# Patient Record
Sex: FEMALE | URBAN - METROPOLITAN AREA
[De-identification: names, ages, dates, MRNs, and addresses within clinical notes are randomized per-mention and may not be internally consistent; named-entity substitution may affect disease eponyms.]

---

## 2020-03-06 ENCOUNTER — APPOINTMENT (RX ONLY)
Dept: URBAN - METROPOLITAN AREA CLINIC 108 | Facility: CLINIC | Age: 25
Setting detail: DERMATOLOGY
End: 2020-03-06

## 2020-03-06 VITALS — SYSTOLIC BLOOD PRESSURE: 119 MMHG | DIASTOLIC BLOOD PRESSURE: 79 MMHG

## 2020-03-06 DIAGNOSIS — L63.8 OTHER ALOPECIA AREATA: ICD-10-CM | Status: WORSENING

## 2020-03-06 PROCEDURE — ? INTRAMUSCULAR KENALOG

## 2020-03-06 PROCEDURE — ? COUNSELING

## 2020-03-06 PROCEDURE — ? SEPARATE AND IDENTIFIABLE DOCUMENTATION

## 2020-03-06 PROCEDURE — 99212 OFFICE O/P EST SF 10 MIN: CPT | Mod: 25

## 2020-03-06 PROCEDURE — ? PRESCRIPTION

## 2020-03-06 RX ORDER — TRETIONIN 0.25 MG/G
GEL TOPICAL
Qty: 1 | Refills: 1 | COMMUNITY
Start: 2020-03-06

## 2020-03-06 RX ADMIN — TRETIONIN: 0.25 GEL TOPICAL at 00:00

## 2020-03-06 ASSESSMENT — LOCATION ZONE DERM: LOCATION ZONE: TRUNK

## 2020-03-06 ASSESSMENT — LOCATION DETAILED DESCRIPTION DERM: LOCATION DETAILED: RIGHT BUTTOCK

## 2020-03-06 ASSESSMENT — LOCATION SIMPLE DESCRIPTION DERM: LOCATION SIMPLE: RIGHT BUTTOCK

## 2020-03-06 ASSESSMENT — SEVERITY ASSESSMENT OVERALL AMONG ALL PATIENTS
IN YOUR EXPERIENCE, AMONG ALL PATIENTS YOU HAVE SEEN WITH THIS CONDITION, HOW SEVERE IS THIS PATIENT'S CONDITION?: S1 (LESS THAN 25% HAIR LOSS)

## 2020-03-06 NOTE — PROCEDURE: INTRAMUSCULAR KENALOG
Detail Level: None
Expiration Date (Optional): sep2020
Concentration (Mg/Ml): 40.0
Lot # (Optional): [id number]
Add Option For Additional Mediation: No
Concentration (Mg/Ml) Of Additional Medication: 2.5
Total Volume (Ccs): 1
Kenalog Preparation: kenalog
Consent: The risks of atrophy were reviewed with the patient.

## 2020-03-06 NOTE — HPI: HAIR LOSS
How Did The Hair Loss Occur?: gradual in onset
How Severe Is Your Hair Loss?: moderate
Additional History: Patient has been getting treated for AA for years. Patient was last seen 3/3/17. At the time we’re treating ot with IMTAC 40mg/ml (1cc). Patient has a history of using Topicort spray which was no help. It was also irritating to the scalp so she stopped topical. Patient also been prescribed clobetasol solution but never filled it. Patient was diagnosed with AA at an early age. Pt has recently had blood work done. Pt maybe be getting X-rays to see the function of her thyroid.

## 2020-05-15 ENCOUNTER — APPOINTMENT (RX ONLY)
Dept: URBAN - METROPOLITAN AREA CLINIC 108 | Facility: CLINIC | Age: 25
Setting detail: DERMATOLOGY
End: 2020-05-15

## 2020-05-15 VITALS — DIASTOLIC BLOOD PRESSURE: 73 MMHG | SYSTOLIC BLOOD PRESSURE: 119 MMHG

## 2020-05-15 DIAGNOSIS — L63.8 OTHER ALOPECIA AREATA: ICD-10-CM

## 2020-05-15 DIAGNOSIS — L20.89 OTHER ATOPIC DERMATITIS: ICD-10-CM

## 2020-05-15 PROBLEM — L20.84 INTRINSIC (ALLERGIC) ECZEMA: Status: ACTIVE | Noted: 2020-05-15

## 2020-05-15 PROCEDURE — ? TREATMENT REGIMEN

## 2020-05-15 PROCEDURE — ? INTRAMUSCULAR KENALOG

## 2020-05-15 PROCEDURE — ? PRESCRIPTION

## 2020-05-15 PROCEDURE — 99212 OFFICE O/P EST SF 10 MIN: CPT | Mod: 25

## 2020-05-15 PROCEDURE — ? COUNSELING

## 2020-05-15 RX ORDER — TRIAMCINOLONE ACETONIDE 1 MG/G
CREAM TOPICAL
Qty: 1 | Refills: 0 | Status: ERX | COMMUNITY
Start: 2020-05-15

## 2020-05-15 RX ORDER — CLOBETASOL PROPIONATE 0.5 MG/ML
SOLUTION TOPICAL
Qty: 1 | Refills: 1 | Status: ERX | COMMUNITY
Start: 2020-05-15

## 2020-05-15 RX ADMIN — TRIAMCINOLONE ACETONIDE: 1 CREAM TOPICAL at 00:00

## 2020-05-15 RX ADMIN — CLOBETASOL PROPIONATE: 0.5 SOLUTION TOPICAL at 00:00

## 2020-05-15 ASSESSMENT — LOCATION SIMPLE DESCRIPTION DERM
LOCATION SIMPLE: LEFT BUTTOCK
LOCATION SIMPLE: LEFT POPLITEAL SKIN

## 2020-05-15 ASSESSMENT — LOCATION ZONE DERM
LOCATION ZONE: TRUNK
LOCATION ZONE: LEG

## 2020-05-15 ASSESSMENT — LOCATION DETAILED DESCRIPTION DERM
LOCATION DETAILED: LEFT POPLITEAL SKIN
LOCATION DETAILED: LEFT BUTTOCK

## 2020-05-15 NOTE — PROCEDURE: INTRAMUSCULAR KENALOG
Detail Level: None
Consent: The risks of atrophy were reviewed with the patient.
Total Volume (Ccs): 1
Concentration (Mg/Ml) Of Additional Medication: 2.5
Kenalog Preparation: kenalog
Lot # (Optional): [id number]
Expiration Date (Optional): 04/2021
Add Option For Additional Mediation: No
Concentration (Mg/Ml): 40.0

## 2020-05-15 NOTE — PROCEDURE: TREATMENT REGIMEN
Initiate Treatment: Tretinoin 0.025% every other night.\\nClobetasol 0.05% Solution to scalp BID X 2 weeks on, 2 weeks off.
Detail Level: Zone

## 2020-06-17 ENCOUNTER — APPOINTMENT (RX ONLY)
Dept: URBAN - METROPOLITAN AREA CLINIC 108 | Facility: CLINIC | Age: 25
Setting detail: DERMATOLOGY
End: 2020-06-17

## 2020-06-17 VITALS — DIASTOLIC BLOOD PRESSURE: 71 MMHG | HEART RATE: 62 BPM | SYSTOLIC BLOOD PRESSURE: 116 MMHG

## 2020-06-17 DIAGNOSIS — L63.8 OTHER ALOPECIA AREATA: ICD-10-CM | Status: IMPROVED

## 2020-06-17 DIAGNOSIS — L70.0 ACNE VULGARIS: ICD-10-CM

## 2020-06-17 PROCEDURE — ? DIAGNOSIS COMMENT

## 2020-06-17 PROCEDURE — 99212 OFFICE O/P EST SF 10 MIN: CPT | Mod: 25

## 2020-06-17 PROCEDURE — ? TREATMENT REGIMEN

## 2020-06-17 PROCEDURE — ? INTRAMUSCULAR KENALOG

## 2020-06-17 PROCEDURE — ? PHOTO-DOCUMENTATION

## 2020-06-17 ASSESSMENT — LOCATION ZONE DERM
LOCATION ZONE: SCALP
LOCATION ZONE: TRUNK

## 2020-06-17 ASSESSMENT — LOCATION SIMPLE DESCRIPTION DERM
LOCATION SIMPLE: RIGHT BUTTOCK
LOCATION SIMPLE: ANTERIOR SCALP

## 2020-06-17 ASSESSMENT — LOCATION DETAILED DESCRIPTION DERM
LOCATION DETAILED: RIGHT BUTTOCK
LOCATION DETAILED: MID-FRONTAL SCALP

## 2020-06-17 NOTE — PROCEDURE: INTRAMUSCULAR KENALOG
Total Volume (Ccs): 1
Concentration (Mg/Ml) Of Additional Medication: 2.5
Consent: The risks of atrophy were reviewed with the patient. Verbal consent was obtained.
Lot # (Optional): [id number]
Add Option For Additional Mediation: No
Treatment Number (Optional): 3
Detail Level: Simple
Kenalog Preparation: kenalog
Expiration Date (Optional): JAN 2021
Ndc# (Optional): 2236-1084-63
Concentration (Mg/Ml): 40.0
Administered By (Optional): Winnie BURNETT

## 2020-07-24 ENCOUNTER — APPOINTMENT (RX ONLY)
Dept: URBAN - METROPOLITAN AREA CLINIC 108 | Facility: CLINIC | Age: 25
Setting detail: DERMATOLOGY
End: 2020-07-24

## 2020-07-24 VITALS — HEART RATE: 63 BPM | DIASTOLIC BLOOD PRESSURE: 77 MMHG | SYSTOLIC BLOOD PRESSURE: 111 MMHG

## 2020-07-24 DIAGNOSIS — L63.8 OTHER ALOPECIA AREATA: ICD-10-CM

## 2020-07-24 PROCEDURE — ? TREATMENT REGIMEN

## 2020-07-24 PROCEDURE — ? INTRALESIONAL KENALOG

## 2020-07-24 PROCEDURE — 11900 INJECT SKIN LESIONS </W 7: CPT

## 2020-07-24 PROCEDURE — ? PRESCRIPTION

## 2020-07-24 RX ORDER — TRETIONIN 0.25 MG/G
GEL TOPICAL
Qty: 1 | Refills: 4 | Status: ERX

## 2020-07-24 ASSESSMENT — LOCATION DETAILED DESCRIPTION DERM
LOCATION DETAILED: RIGHT INFERIOR OCCIPITAL SCALP
LOCATION DETAILED: LEFT OCCIPITAL SCALP
LOCATION DETAILED: MID-OCCIPITAL SCALP

## 2020-07-24 ASSESSMENT — LOCATION SIMPLE DESCRIPTION DERM: LOCATION SIMPLE: POSTERIOR SCALP

## 2020-07-24 ASSESSMENT — LOCATION ZONE DERM: LOCATION ZONE: SCALP

## 2020-07-24 NOTE — PROCEDURE: INTRALESIONAL KENALOG
Administered By (Optional): Dr. Montalvo
Treatment Number (Optional): 1
Expiration Date (Optional): JUN 2021
X Size Of Lesion In Cm (Optional): 0
Include Z78.9 (Other Specified Conditions Influencing Health Status) As An Associated Diagnosis?: No
Medical Necessity Clause: This procedure was medically necessary because the lesions that were treated were:
Consent: The risks of atrophy were reviewed with the patient. Verbal consent was obtained.
Kenalog Preparation: Kenalog
Detail Level: Zone
Concentration Of Solution Injected (Mg/Ml): 10.0
Total Volume Injected (Ccs- Only Use Numbers And Decimals): 0.3
Lot # (Optional): [id number]
Ndc# For Kenalog Only: 0627-7326-99

## 2020-07-24 NOTE — PROCEDURE: TREATMENT REGIMEN
Continue Regimen: - Tretinoin 0.025% Gel QHS to scalp.
Modify Regimen: - Clobetasol 0.05% solution BID weekends only. May increase to previous regimen if having recurrent flares.
Detail Level: Simple

## 2020-09-04 ENCOUNTER — APPOINTMENT (RX ONLY)
Dept: URBAN - METROPOLITAN AREA CLINIC 108 | Facility: CLINIC | Age: 25
Setting detail: DERMATOLOGY
End: 2020-09-04

## 2020-09-04 VITALS — SYSTOLIC BLOOD PRESSURE: 116 MMHG | DIASTOLIC BLOOD PRESSURE: 71 MMHG | HEART RATE: 63 BPM

## 2020-09-04 DIAGNOSIS — L63.8 OTHER ALOPECIA AREATA: ICD-10-CM

## 2020-09-04 PROCEDURE — ? INTRALESIONAL KENALOG

## 2020-09-04 PROCEDURE — 11900 INJECT SKIN LESIONS </W 7: CPT

## 2020-09-04 PROCEDURE — ? TREATMENT REGIMEN

## 2020-09-04 ASSESSMENT — LOCATION ZONE DERM: LOCATION ZONE: SCALP

## 2020-09-04 ASSESSMENT — LOCATION SIMPLE DESCRIPTION DERM
LOCATION SIMPLE: SCALP
LOCATION SIMPLE: POSTERIOR SCALP

## 2020-09-04 ASSESSMENT — LOCATION DETAILED DESCRIPTION DERM
LOCATION DETAILED: LEFT CENTRAL PARIETAL SCALP
LOCATION DETAILED: LEFT SUPERIOR PARIETAL SCALP
LOCATION DETAILED: RIGHT SUPERIOR PARIETAL SCALP
LOCATION DETAILED: MID-OCCIPITAL SCALP

## 2020-09-04 NOTE — PROCEDURE: INTRALESIONAL KENALOG
Administered By (Optional): Dr. Montalvo
Treatment Number (Optional): 2
Expiration Date (Optional): AUG 2021
X Size Of Lesion In Cm (Optional): 0
Include Z78.9 (Other Specified Conditions Influencing Health Status) As An Associated Diagnosis?: No
Medical Necessity Clause: This procedure was medically necessary because the lesions that were treated were:
Consent: The risks of atrophy were reviewed with the patient. Verbal consent was obtained.
Kenalog Preparation: Kenalog
Detail Level: Zone
Concentration Of Solution Injected (Mg/Ml): 10.0
Total Volume Injected (Ccs- Only Use Numbers And Decimals): 0.2
Lot # (Optional): [id number]
Ndc# For Kenalog Only: 2848-1785-39

## 2020-09-04 NOTE — PROCEDURE: TREATMENT REGIMEN
Continue Regimen: - Tretinoin 0.025% Gel QHS to scalp.\\n\\n- Clobetasol 0.05% solution BID weekends only. May increase to previous regimen if having recurrent flares.
Detail Level: Simple

## 2020-10-27 ENCOUNTER — APPOINTMENT (RX ONLY)
Dept: URBAN - METROPOLITAN AREA CLINIC 108 | Facility: CLINIC | Age: 25
Setting detail: DERMATOLOGY
End: 2020-10-27

## 2020-10-27 ENCOUNTER — RX ONLY (OUTPATIENT)
Age: 25
Setting detail: RX ONLY
End: 2020-10-27

## 2020-10-27 VITALS — SYSTOLIC BLOOD PRESSURE: 109 MMHG | DIASTOLIC BLOOD PRESSURE: 67 MMHG | HEART RATE: 66 BPM

## 2020-10-27 DIAGNOSIS — L63.8 OTHER ALOPECIA AREATA: ICD-10-CM

## 2020-10-27 PROCEDURE — ? TREATMENT REGIMEN

## 2020-10-27 PROCEDURE — ? INTRALESIONAL KENALOG

## 2020-10-27 PROCEDURE — 11900 INJECT SKIN LESIONS </W 7: CPT

## 2020-10-27 RX ORDER — CLOBETASOL PROPIONATE 0.5 MG/ML
SOLUTION TOPICAL
Qty: 1 | Refills: 1 | Status: ERX

## 2020-10-27 ASSESSMENT — LOCATION ZONE DERM: LOCATION ZONE: SCALP

## 2020-10-27 ASSESSMENT — LOCATION SIMPLE DESCRIPTION DERM
LOCATION SIMPLE: ANTERIOR SCALP
LOCATION SIMPLE: LEFT SCALP
LOCATION SIMPLE: SCALP
LOCATION SIMPLE: RIGHT SCALP

## 2020-10-27 ASSESSMENT — LOCATION DETAILED DESCRIPTION DERM
LOCATION DETAILED: RIGHT MEDIAL FRONTAL SCALP
LOCATION DETAILED: RIGHT SUPERIOR POSTAURICULAR SKIN
LOCATION DETAILED: MID-FRONTAL SCALP
LOCATION DETAILED: LEFT SUPERIOR POSTAURICULAR SKIN
LOCATION DETAILED: RIGHT CENTRAL POSTAURICULAR SKIN
LOCATION DETAILED: LEFT MEDIAL FRONTAL SCALP
LOCATION DETAILED: LEFT CENTRAL POSTAURICULAR SKIN

## 2020-10-27 NOTE — PROCEDURE: TREATMENT REGIMEN
Initiate Treatment: - Restart Clobetasol 0.05% Solution BID x 2 weeks. \\n- Restart Tretinoin0.025% Gel QHS.
Detail Level: Zone

## 2020-10-27 NOTE — PROCEDURE: INTRALESIONAL KENALOG
Lot # (Optional): [id number]
Consent: The risks of atrophy were reviewed with the patient. Verbal consent was obtained.
Expiration Date (Optional): 03/2022
Include Z78.9 (Other Specified Conditions Influencing Health Status) As An Associated Diagnosis?: No
Detail Level: Zone
Total Volume Injected (Ccs- Only Use Numbers And Decimals): .35
Medical Necessity Clause: This procedure was medically necessary because the lesions that were treated were:
Administered By (Optional): Dr. Montalvo
Kenalog Preparation: Kenalog
Concentration Of Solution Injected (Mg/Ml): 10.0
X Size Of Lesion In Cm (Optional): 0
Ndc# For Kenalog Only: [id number]

## 2020-12-17 ENCOUNTER — APPOINTMENT (RX ONLY)
Dept: URBAN - METROPOLITAN AREA CLINIC 108 | Facility: CLINIC | Age: 25
Setting detail: DERMATOLOGY
End: 2020-12-17

## 2020-12-17 VITALS — SYSTOLIC BLOOD PRESSURE: 116 MMHG | HEART RATE: 66 BPM | DIASTOLIC BLOOD PRESSURE: 71 MMHG

## 2020-12-17 DIAGNOSIS — L63.8 OTHER ALOPECIA AREATA: ICD-10-CM | Status: WORSENING

## 2020-12-17 PROCEDURE — ? TREATMENT REGIMEN

## 2020-12-17 PROCEDURE — ? INTRAMUSCULAR KENALOG

## 2020-12-17 PROCEDURE — ? RECOMMENDATIONS

## 2020-12-17 PROCEDURE — 96372 THER/PROPH/DIAG INJ SC/IM: CPT

## 2020-12-17 ASSESSMENT — LOCATION ZONE DERM
LOCATION ZONE: SCALP
LOCATION ZONE: TRUNK

## 2020-12-17 ASSESSMENT — LOCATION DETAILED DESCRIPTION DERM
LOCATION DETAILED: POSTERIOR MID-PARIETAL SCALP
LOCATION DETAILED: LEFT BUTTOCK

## 2020-12-17 ASSESSMENT — LOCATION SIMPLE DESCRIPTION DERM
LOCATION SIMPLE: LEFT BUTTOCK
LOCATION SIMPLE: POSTERIOR SCALP

## 2020-12-17 NOTE — PROCEDURE: INTRAMUSCULAR KENALOG
Concentration (Mg/Ml): 40.0
Expiration Date (Optional): 4/2021
Ndc# (Optional): 3232-3739-25
Consent: The risks of atrophy were reviewed with the patient.
Lot # (Optional): [id number]
Administered By (Optional): Miranda LOZANO
Kenalog Preparation: kenalog
Total Volume (Ccs): 1
Add Option For Additional Mediation: No
Concentration (Mg/Ml) Of Additional Medication: 2.5
Detail Level: None

## 2020-12-17 NOTE — PROCEDURE: RECOMMENDATIONS
Detail Level: Zone
Recommendation Override: To see Dr. Arlette Bermudez re alternative tmts including biologic or SADBE. Information given to pt.
Recommendation Preamble: The following recommendations were made during the visit:

## 2021-02-11 ENCOUNTER — APPOINTMENT (RX ONLY)
Dept: URBAN - METROPOLITAN AREA CLINIC 108 | Facility: CLINIC | Age: 26
Setting detail: DERMATOLOGY
End: 2021-02-11

## 2021-02-11 VITALS — HEART RATE: 71 BPM | DIASTOLIC BLOOD PRESSURE: 64 MMHG | SYSTOLIC BLOOD PRESSURE: 117 MMHG

## 2021-02-11 DIAGNOSIS — L90.8 OTHER ATROPHIC DISORDERS OF SKIN: ICD-10-CM

## 2021-02-11 DIAGNOSIS — L63.8 OTHER ALOPECIA AREATA: ICD-10-CM

## 2021-02-11 PROCEDURE — ? INTRALESIONAL KENALOG

## 2021-02-11 PROCEDURE — ? PRESCRIPTION MEDICATION MANAGEMENT

## 2021-02-11 PROCEDURE — ? PHOTO-DOCUMENTATION

## 2021-02-11 PROCEDURE — 11900 INJECT SKIN LESIONS </W 7: CPT

## 2021-02-11 PROCEDURE — ? PRESCRIPTION

## 2021-02-11 PROCEDURE — ? DIAGNOSIS COMMENT

## 2021-02-11 RX ORDER — CLOBETASOL PROPIONATE 0.5 MG/ML
SOLUTION TOPICAL
Qty: 1 | Refills: 2 | Status: ERX

## 2021-02-11 ASSESSMENT — LOCATION ZONE DERM
LOCATION ZONE: TRUNK
LOCATION ZONE: SCALP

## 2021-02-11 ASSESSMENT — LOCATION SIMPLE DESCRIPTION DERM
LOCATION SIMPLE: POSTERIOR SCALP
LOCATION SIMPLE: SCALP
LOCATION SIMPLE: LEFT BUTTOCK

## 2021-02-11 ASSESSMENT — LOCATION DETAILED DESCRIPTION DERM
LOCATION DETAILED: LEFT SUPERIOR PARIETAL SCALP
LOCATION DETAILED: RIGHT SUPERIOR PARIETAL SCALP
LOCATION DETAILED: LEFT BUTTOCK
LOCATION DETAILED: POSTERIOR MID-PARIETAL SCALP

## 2021-02-11 NOTE — PROCEDURE: PRESCRIPTION MEDICATION MANAGEMENT
Detail Level: Zone
Render In Strict Bullet Format?: No
Continue Regimen: - Clobetasol 0.05% Solution BID wknds only. Avoid face, groin and underarms. \\n- Tretinoin 0.025% Gel QHS to scalp.

## 2021-02-11 NOTE — PROCEDURE: INTRALESIONAL KENALOG
Total Volume Injected (Ccs- Only Use Numbers And Decimals): .15
Lot # (Optional): [id number]
Kenalog Preparation: Kenalog
Consent: The risks of atrophy were reviewed with the patient. Consent was verbally obtained.
Detail Level: Zone
Ndc# For Kenalog Only: [phone]- 20
X Size Of Lesion In Cm (Optional): 0
Include Z78.9 (Other Specified Conditions Influencing Health Status) As An Associated Diagnosis?: No
Concentration Of Solution Injected (Mg/Ml): 5.0
Medical Necessity Clause: This procedure was medically necessary because the lesions that were treated were:
Administered By (Optional): Dr. Montalvo
Expiration Date (Optional): MAR 2022

## 2021-02-11 NOTE — PROCEDURE: DIAGNOSIS COMMENT
Detail Level: Simple
Render Risk Assessment In Note?: no
Comment: Due to [facility] in 05/2020.

## 2021-03-11 ENCOUNTER — APPOINTMENT (RX ONLY)
Dept: URBAN - METROPOLITAN AREA CLINIC 108 | Facility: CLINIC | Age: 26
Setting detail: DERMATOLOGY
End: 2021-03-11

## 2021-03-11 DIAGNOSIS — L63.8 OTHER ALOPECIA AREATA: ICD-10-CM

## 2021-03-11 PROCEDURE — 11900 INJECT SKIN LESIONS </W 7: CPT

## 2021-03-11 PROCEDURE — ? INTRALESIONAL KENALOG

## 2021-03-11 ASSESSMENT — LOCATION DETAILED DESCRIPTION DERM
LOCATION DETAILED: RIGHT MEDIAL FRONTAL SCALP
LOCATION DETAILED: LEFT MEDIAL FRONTAL SCALP
LOCATION DETAILED: RIGHT SUPERIOR PARIETAL SCALP
LOCATION DETAILED: POSTERIOR MID-PARIETAL SCALP

## 2021-03-11 ASSESSMENT — LOCATION SIMPLE DESCRIPTION DERM
LOCATION SIMPLE: POSTERIOR SCALP
LOCATION SIMPLE: SCALP
LOCATION SIMPLE: LEFT SCALP
LOCATION SIMPLE: RIGHT SCALP

## 2021-03-11 ASSESSMENT — LOCATION ZONE DERM: LOCATION ZONE: SCALP

## 2021-03-11 NOTE — PROCEDURE: INTRALESIONAL KENALOG
Consent: The risks of atrophy were reviewed with the patient. Consent was verbally obtained.
Lot # (Optional): [id number]
Expiration Date (Optional): MAR 2020
Total Volume Injected (Ccs- Only Use Numbers And Decimals): 0.35
Medical Necessity Clause: This procedure was medically necessary because the lesions that were treated were:
Detail Level: Zone
Concentration Of Solution Injected (Mg/Ml): 5.0
Administered By (Optional): Dr. Montalvo
Kenalog Preparation: Kenalog
Include Z78.9 (Other Specified Conditions Influencing Health Status) As An Associated Diagnosis?: No
X Size Of Lesion In Cm (Optional): 0
Ndc# For Kenalog Only: 0003- 0494-20

## 2021-06-21 NOTE — PROCEDURE: TREATMENT REGIMEN
Continue Regimen: - Tretinoin 0.025% Gel QHS to scalp. \\n- Clobetasol 0.05% Solution BID x 2 weeks on and 2 weeks off. Avoid face, groin and underarms.
Detail Level: Zone
Samples Given: Amzeeq Foam.
Plan: - May use Tretinoin 0.025% Cream QHS if no improvement with Amzeeq Foam.
Initiate Treatment: - Amzeeq foam QD to face.
no

## 2021-06-25 ENCOUNTER — APPOINTMENT (RX ONLY)
Dept: URBAN - METROPOLITAN AREA CLINIC 69 | Facility: CLINIC | Age: 26
Setting detail: DERMATOLOGY
End: 2021-06-25

## 2021-06-25 VITALS — DIASTOLIC BLOOD PRESSURE: 70 MMHG | SYSTOLIC BLOOD PRESSURE: 102 MMHG | HEART RATE: 74 BPM

## 2021-06-25 DIAGNOSIS — L63.8 OTHER ALOPECIA AREATA: ICD-10-CM | Status: WORSENING

## 2021-06-25 PROCEDURE — ? PHOTO-DOCUMENTATION

## 2021-06-25 PROCEDURE — ? ADDITIONAL NOTES

## 2021-06-25 PROCEDURE — 96372 THER/PROPH/DIAG INJ SC/IM: CPT

## 2021-06-25 PROCEDURE — ? INTRAMUSCULAR KENALOG

## 2021-06-25 PROCEDURE — ? PRESCRIPTION MEDICATION MANAGEMENT

## 2021-06-25 ASSESSMENT — LOCATION DETAILED DESCRIPTION DERM: LOCATION DETAILED: RIGHT BUTTOCK

## 2021-06-25 ASSESSMENT — LOCATION ZONE DERM: LOCATION ZONE: TRUNK

## 2021-06-25 ASSESSMENT — SEVERITY OF ALOPECIA TOOL: % SCALP HAIR LOST: 50

## 2021-06-25 ASSESSMENT — LOCATION SIMPLE DESCRIPTION DERM: LOCATION SIMPLE: RIGHT BUTTOCK

## 2021-06-25 NOTE — PROCEDURE: ADDITIONAL NOTES
Detail Level: Simple
Render Risk Assessment In Note?: no
Additional Notes: Recommended to see Dr. Veronica Syed for new study for medication

## 2021-06-25 NOTE — PROCEDURE: PRESCRIPTION MEDICATION MANAGEMENT
Detail Level: Zone
Modify Regimen: Hold Tretinoin for another week while sunburn resolves.
Initiate Treatment: Add clobetasol solution bid to scalp weekends only.
Render In Strict Bullet Format?: No
Plan: Use spray sunscreen\\nWear hats if outside.

## 2021-07-22 ENCOUNTER — APPOINTMENT (RX ONLY)
Dept: URBAN - METROPOLITAN AREA CLINIC 69 | Facility: CLINIC | Age: 26
Setting detail: DERMATOLOGY
End: 2021-07-22

## 2021-07-22 VITALS — DIASTOLIC BLOOD PRESSURE: 85 MMHG | SYSTOLIC BLOOD PRESSURE: 123 MMHG | HEART RATE: 68 BPM

## 2021-07-22 DIAGNOSIS — L63.8 OTHER ALOPECIA AREATA: ICD-10-CM | Status: WORSENING

## 2021-07-22 PROCEDURE — ? ADDITIONAL NOTES

## 2021-07-22 PROCEDURE — 99213 OFFICE O/P EST LOW 20 MIN: CPT

## 2021-07-22 PROCEDURE — ? PRESCRIPTION MEDICATION MANAGEMENT

## 2021-07-22 PROCEDURE — ? INTRAMUSCULAR KENALOG

## 2021-07-22 ASSESSMENT — LOCATION SIMPLE DESCRIPTION DERM: LOCATION SIMPLE: LEFT BUTTOCK

## 2021-07-22 ASSESSMENT — LOCATION DETAILED DESCRIPTION DERM: LOCATION DETAILED: LEFT BUTTOCK

## 2021-07-22 ASSESSMENT — LOCATION ZONE DERM: LOCATION ZONE: TRUNK

## 2021-07-22 NOTE — PROCEDURE: ADDITIONAL NOTES
Detail Level: Simple
Render Risk Assessment In Note?: no
Additional Notes: Recommended to see Dr. Veronica Syed for new study for medication, information to Dr. Syed’s office given to pt.

## 2021-07-22 NOTE — PROCEDURE: PRESCRIPTION MEDICATION MANAGEMENT
Detail Level: Zone
Modify Regimen: Add clobetasol solution bid to scalp weekends only.
Render In Strict Bullet Format?: No

## 2021-07-22 NOTE — PROCEDURE: INTRAMUSCULAR KENALOG
Detail Level: None
Consent: The risks of atrophy were reviewed with the patient.
Add Option For Additional Mediation: No
Treatment Number (Optional): 2
Concentration (Mg/Ml) Of Additional Medication: 2.5
Total Volume (Ccs): 1
Administered By (Optional): Miranda LOZANO
Kenalog Preparation: kenalog
Concentration (Mg/Ml): 40.0

## 2021-08-19 ENCOUNTER — APPOINTMENT (RX ONLY)
Dept: URBAN - METROPOLITAN AREA CLINIC 69 | Facility: CLINIC | Age: 26
Setting detail: DERMATOLOGY
End: 2021-08-19

## 2021-08-19 VITALS — SYSTOLIC BLOOD PRESSURE: 118 MMHG | DIASTOLIC BLOOD PRESSURE: 93 MMHG | HEART RATE: 71 BPM

## 2021-08-19 VITALS — SYSTOLIC BLOOD PRESSURE: 115 MMHG | DIASTOLIC BLOOD PRESSURE: 74 MMHG | HEART RATE: 65 BPM

## 2021-08-19 DIAGNOSIS — L63.8 OTHER ALOPECIA AREATA: ICD-10-CM | Status: IMPROVED

## 2021-08-19 PROCEDURE — ? INTRAMUSCULAR KENALOG

## 2021-08-19 PROCEDURE — 99213 OFFICE O/P EST LOW 20 MIN: CPT

## 2021-08-19 PROCEDURE — ? PRESCRIPTION MEDICATION MANAGEMENT

## 2021-08-19 PROCEDURE — ? DIAGNOSIS COMMENT

## 2021-08-19 ASSESSMENT — LOCATION SIMPLE DESCRIPTION DERM
LOCATION SIMPLE: RIGHT BUTTOCK
LOCATION SIMPLE: POSTERIOR SCALP

## 2021-08-19 ASSESSMENT — LOCATION DETAILED DESCRIPTION DERM
LOCATION DETAILED: RIGHT BUTTOCK
LOCATION DETAILED: POSTERIOR MID-PARIETAL SCALP

## 2021-08-19 ASSESSMENT — LOCATION ZONE DERM
LOCATION ZONE: TRUNK
LOCATION ZONE: SCALP

## 2021-08-19 ASSESSMENT — SEVERITY ASSESSMENT OVERALL AMONG ALL PATIENTS
IN YOUR EXPERIENCE, AMONG ALL PATIENTS YOU HAVE SEEN WITH THIS CONDITION, HOW SEVERE IS THIS PATIENT'S CONDITION?: S2 (25-49% HAIR LOSS)

## 2021-08-19 NOTE — PROCEDURE: INTRAMUSCULAR KENALOG
Kenalog Preparation: kenalog
Add Option For Additional Mediation: No
Treatment Number (Optional): 3
Concentration (Mg/Ml) Of Additional Medication: 2.5
Detail Level: None
Total Volume (Ccs): 1
Concentration (Mg/Ml): 40.0
Administered By (Optional): Miranda FOURNIER
Consent: The risks of atrophy were reviewed with the patient.

## 2021-08-19 NOTE — PROCEDURE: PRESCRIPTION MEDICATION MANAGEMENT
Detail Level: Zone
Modify Regimen: Use Tretinoin 0.025% gel all over her scalp.
Continue Regimen: Clobetasol Solution- bid on weekends only.
Render In Strict Bullet Format?: No
Plan: Will contact Dr. Syed’s office re study.

## 2023-04-27 ENCOUNTER — APPOINTMENT (RX ONLY)
Dept: URBAN - METROPOLITAN AREA CLINIC 69 | Facility: CLINIC | Age: 28
Setting detail: DERMATOLOGY
End: 2023-04-27

## 2023-04-27 DIAGNOSIS — L63.8 OTHER ALOPECIA AREATA: ICD-10-CM

## 2023-04-27 PROCEDURE — ? TREATMENT REGIMEN

## 2023-04-27 PROCEDURE — ? PHOTO-DOCUMENTATION

## 2023-04-27 PROCEDURE — 99213 OFFICE O/P EST LOW 20 MIN: CPT

## 2023-04-27 PROCEDURE — ? PRESCRIPTION

## 2023-04-27 RX ORDER — MOMETASONE FUROATE 1 MG/ML
SOLUTION TOPICAL
Qty: 60 | Refills: 1 | Status: ERX | COMMUNITY
Start: 2023-04-27

## 2023-04-27 RX ADMIN — MOMETASONE FUROATE: 1 SOLUTION TOPICAL at 00:00

## 2023-04-27 ASSESSMENT — LOCATION ZONE DERM
LOCATION ZONE: FACE
LOCATION ZONE: SCALP

## 2023-04-27 ASSESSMENT — LOCATION SIMPLE DESCRIPTION DERM
LOCATION SIMPLE: SCALP
LOCATION SIMPLE: LEFT FOREHEAD
LOCATION SIMPLE: LEFT SCALP

## 2023-04-27 ASSESSMENT — LOCATION DETAILED DESCRIPTION DERM
LOCATION DETAILED: LEFT MEDIAL FRONTAL SCALP
LOCATION DETAILED: RIGHT CENTRAL PARIETAL SCALP
LOCATION DETAILED: LEFT SUPERIOR FOREHEAD

## 2023-04-27 NOTE — PROCEDURE: TREATMENT REGIMEN
Initiate Treatment: Mometasone solution BIDX 2 weeks then use BIW
Plan: Discussed ILTAC injections, can ask OBGYN re ok to get while pregnant.
Detail Level: Simple